# Patient Record
Sex: MALE | Race: OTHER | URBAN - METROPOLITAN AREA
[De-identification: names, ages, dates, MRNs, and addresses within clinical notes are randomized per-mention and may not be internally consistent; named-entity substitution may affect disease eponyms.]

---

## 2019-01-30 NOTE — PATIENT DISCUSSION
"""Phaco with IOL OD: 02/07/2019 Paulette ROTH ZKB00 15.5 Target: [facility]

## 2019-02-07 NOTE — PATIENT DISCUSSION
"""S/P IOL OD: Tecnis  ZKB00 15.5 (Target: [place]) +ORA/Femto/Arcs +TM/Omidria. Continue post operative instructions and drops per schedule.  """

## 2019-02-13 NOTE — PATIENT DISCUSSION
"""S/P IOL OD: Tecnis  ZKB00 15.5 (Target: [place]) +ORA/Femto/Arcs +TM/Omidria. Continue post operative instructions and drops per schedule.  """

## 2019-02-13 NOTE — PATIENT DISCUSSION
"""Phaco with IOL OS: 02/21/2019 Paulette ROTH ZLB00 17.5 vs 18.0 (ORA) Target: [facility]

## 2019-02-27 NOTE — PATIENT DISCUSSION
"""S/P IOL OS: Tecnis MF ZLB00 17.5 vs 18.0 (ORA) (Target: [place]) +ORA/Femto/Arcs +TM/Omidria. Continue post operative instructions and drops per schedule.  """

## 2019-04-03 NOTE — PATIENT DISCUSSION
"""S/P IOL OU: OD: Teckevan ROTH ZKB00 15.5 (Target: [place]) +ORAFemto/Arcs +TMOmidria. OS: Teckevan ROTH ZLB00 17.5 vs 18.0 (ORA) (Target: [place]) +ORA/Arcs +TM. "

## 2020-03-04 NOTE — PATIENT DISCUSSION
"""IOP stable with current drop therapy at this time. WIll continue to monitor."" ""Continue Bimonidine 0.2% both eyes twice a day . """

## 2021-04-15 NOTE — PATIENT DISCUSSION
Dermatochalasis BUL bothersome to patient. Recommend ptosis visual field and photos at patient's convenience for possible blepharoplasty. Patient will be contacted with results.

## 2021-11-17 NOTE — PATIENT DISCUSSION
If dryness becomes more bothersome to patient will refer patient to Dr. Severo Kuhn for Dry Eye consult for possible Lipiflow procedure.

## 2022-05-17 NOTE — PATIENT DISCUSSION
Patient took VF/ Photos 1/2021; was recommended she retake in 6 months at that time. Will have patient schedule VF Photos at her convenience.

## 2023-04-12 ENCOUNTER — DIAGNOSTICS ONLY (OUTPATIENT)
Dept: URBAN - METROPOLITAN AREA CLINIC 49 | Facility: CLINIC | Age: 78
End: 2023-04-12

## 2023-04-12 DIAGNOSIS — H02.834: ICD-10-CM

## 2023-04-12 DIAGNOSIS — H57.813: ICD-10-CM

## 2023-04-12 DIAGNOSIS — H02.831: ICD-10-CM

## 2023-04-12 PROCEDURE — 92285 EXTERNAL OCULAR PHOTOGRAPHY: CPT

## 2023-04-12 PROCEDURE — 92082 INTERMEDIATE VISUAL FIELD XM: CPT

## 2023-04-12 ASSESSMENT — KERATOMETRY
OD_K1POWER_DIOPTERS: 44.00
OS_K2POWER_DIOPTERS: 45.50
OS_AXISANGLE2_DEGREES: 165
OD_AXISANGLE2_DEGREES: 20
OD_K2POWER_DIOPTERS: 45.25
OD_AXISANGLE_DEGREES: 110
OS_K1POWER_DIOPTERS: 44.50
OS_AXISANGLE_DEGREES: 75

## 2023-06-30 ENCOUNTER — PRE-OP/H&P (OUTPATIENT)
Dept: URBAN - METROPOLITAN AREA CLINIC 52 | Facility: CLINIC | Age: 78
End: 2023-06-30

## 2023-06-30 DIAGNOSIS — H02.834: ICD-10-CM

## 2023-06-30 DIAGNOSIS — H02.831: ICD-10-CM

## 2023-06-30 PROCEDURE — PREOP PRE OP VISIT

## 2023-06-30 ASSESSMENT — KERATOMETRY
OS_K2POWER_DIOPTERS: 45.50
OD_K1POWER_DIOPTERS: 44.00
OD_AXISANGLE2_DEGREES: 20
OS_AXISANGLE2_DEGREES: 165
OS_AXISANGLE_DEGREES: 75
OD_AXISANGLE_DEGREES: 110
OD_K2POWER_DIOPTERS: 45.25
OS_K1POWER_DIOPTERS: 44.50

## 2023-06-30 ASSESSMENT — VISUAL ACUITY
OD_SC: 20/25-2
OS_SC: 20/40-1
OS_PH: 20/25-2

## 2023-07-11 ENCOUNTER — SURGERY/PROCEDURE (OUTPATIENT)
Dept: URBAN - METROPOLITAN AREA SURGERY 16 | Facility: SURGERY | Age: 78
End: 2023-07-11

## 2023-07-11 DIAGNOSIS — H02.831: ICD-10-CM

## 2023-07-11 DIAGNOSIS — H02.834: ICD-10-CM

## 2023-07-11 PROCEDURE — 15823 50 BLEPHAROPLASTY, UPPER WITH EXTENSIVE SKIN WEIGHING DOWN LID (BILATERAL)

## 2023-07-11 ASSESSMENT — KERATOMETRY
OD_K2POWER_DIOPTERS: 45.25
OD_AXISANGLE_DEGREES: 110
OS_K1POWER_DIOPTERS: 44.50
OS_K2POWER_DIOPTERS: 45.50
OD_K1POWER_DIOPTERS: 44.00
OD_AXISANGLE2_DEGREES: 20
OS_AXISANGLE2_DEGREES: 165
OS_AXISANGLE_DEGREES: 75

## 2023-07-21 ENCOUNTER — POST-OP (OUTPATIENT)
Dept: URBAN - METROPOLITAN AREA CLINIC 52 | Facility: CLINIC | Age: 78
End: 2023-07-21

## 2023-07-21 DIAGNOSIS — Z98.890: ICD-10-CM

## 2023-07-21 PROCEDURE — 99024 POSTOP FOLLOW-UP VISIT: CPT

## 2023-07-21 ASSESSMENT — KERATOMETRY
OD_K2POWER_DIOPTERS: 45.25
OD_K1POWER_DIOPTERS: 44.00
OS_AXISANGLE2_DEGREES: 165
OS_K1POWER_DIOPTERS: 44.50
OD_AXISANGLE2_DEGREES: 20
OD_AXISANGLE_DEGREES: 110
OS_K2POWER_DIOPTERS: 45.50
OS_AXISANGLE_DEGREES: 75

## 2023-07-21 ASSESSMENT — VISUAL ACUITY
OD_SC: 20/40
OS_PH: 20/25-2
OD_PH: 20/25-2
OS_SC: 20/30

## 2023-08-11 ENCOUNTER — POST-OP (OUTPATIENT)
Dept: URBAN - METROPOLITAN AREA CLINIC 52 | Facility: CLINIC | Age: 78
End: 2023-08-11

## 2023-08-11 DIAGNOSIS — Z98.890: ICD-10-CM

## 2023-08-11 PROCEDURE — 92285 EXTERNAL OCULAR PHOTOGRAPHY: CPT

## 2023-08-11 PROCEDURE — 99024 POSTOP FOLLOW-UP VISIT: CPT

## 2023-08-11 ASSESSMENT — VISUAL ACUITY
OS_CC: 20/20-2
OD_CC: 20/25-1

## 2024-09-01 NOTE — PATIENT DISCUSSION
"""Continue Brimonidine OU BID.""" Updated transfer center concerning patient new labs collected and requested them to contact [place].